# Patient Record
Sex: MALE | Race: WHITE | NOT HISPANIC OR LATINO | Employment: STUDENT | URBAN - METROPOLITAN AREA
[De-identification: names, ages, dates, MRNs, and addresses within clinical notes are randomized per-mention and may not be internally consistent; named-entity substitution may affect disease eponyms.]

---
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
## 2024-01-05 ENCOUNTER — TELEPHONE (OUTPATIENT)
Dept: ADMINISTRATIVE | Facility: HOSPITAL | Age: 19
End: 2024-01-05
Payer: COMMERCIAL

## 2024-01-11 DIAGNOSIS — R05.3 CHRONIC COUGH: ICD-10-CM

## 2024-01-13 DIAGNOSIS — R05.9 COUGH, UNSPECIFIED TYPE: Primary | ICD-10-CM

## 2024-01-19 ENCOUNTER — TELEPHONE (OUTPATIENT)
Dept: INTERNAL MEDICINE | Facility: CLINIC | Age: 19
End: 2024-01-19
Payer: COMMERCIAL

## 2024-01-19 NOTE — TELEPHONE ENCOUNTER
----- Message from Gray Blunt MA sent at 1/19/2024 11:13 AM CST -----  [facility] calling to inform the provider the prescription budesonide (PULMICORT FLEXHALER) 90 mcg/actuation AePB is not covered by the patient's insurance. [facility] at [phone] Ref#: [id number]    Preferred Covered prescriptions are:  Arnuity inhaler 50mcg 100mcg and 200mcg in stock   Qvar inhaler 40mcg and 80mcg

## 2024-03-13 ENCOUNTER — PATIENT MESSAGE (OUTPATIENT)
Dept: INTERNAL MEDICINE | Facility: CLINIC | Age: 19
End: 2024-03-13
Payer: COMMERCIAL

## 2024-03-13 DIAGNOSIS — F41.9 ANXIETY: Primary | ICD-10-CM

## 2024-03-13 DIAGNOSIS — F32.A DEPRESSION, UNSPECIFIED DEPRESSION TYPE: ICD-10-CM

## 2024-03-14 RX ORDER — HYDROXYZINE HYDROCHLORIDE 25 MG/1
25 TABLET, FILM COATED ORAL 3 TIMES DAILY PRN
Qty: 90 TABLET | Refills: 2 | Status: SHIPPED | OUTPATIENT
Start: 2024-03-14 | End: 2024-06-12

## 2024-03-14 RX ORDER — SERTRALINE HYDROCHLORIDE 100 MG/1
100 TABLET, FILM COATED ORAL DAILY
Qty: 90 TABLET | Refills: 3 | Status: SHIPPED | OUTPATIENT
Start: 2024-03-14 | End: 2025-03-14

## 2024-05-21 ENCOUNTER — OFFICE VISIT (OUTPATIENT)
Dept: INTERNAL MEDICINE | Facility: CLINIC | Age: 19
End: 2024-05-21
Payer: COMMERCIAL

## 2024-05-21 VITALS
WEIGHT: 149.5 LBS | HEART RATE: 71 BPM | OXYGEN SATURATION: 99 % | HEIGHT: 69 IN | DIASTOLIC BLOOD PRESSURE: 76 MMHG | BODY MASS INDEX: 22.14 KG/M2 | SYSTOLIC BLOOD PRESSURE: 112 MMHG

## 2024-05-21 DIAGNOSIS — K59.00 CONSTIPATION, UNSPECIFIED CONSTIPATION TYPE: ICD-10-CM

## 2024-05-21 DIAGNOSIS — R63.0 DECREASE IN APPETITE: ICD-10-CM

## 2024-05-21 DIAGNOSIS — F50.82 AVOIDANT-RESTRICTIVE FOOD INTAKE DISORDER (ARFID): Primary | ICD-10-CM

## 2024-05-21 PROCEDURE — 99999 PR PBB SHADOW E&M-EST. PATIENT-LVL III: CPT | Mod: PBBFAC,,,

## 2024-05-21 RX ORDER — OLANZAPINE 5 MG/1
5 TABLET ORAL NIGHTLY
Qty: 30 TABLET | Refills: 11 | Status: CANCELLED | OUTPATIENT
Start: 2024-05-21 | End: 2025-05-21

## 2024-05-21 RX ORDER — MIRTAZAPINE 7.5 MG/1
7.5 TABLET, FILM COATED ORAL NIGHTLY
Qty: 30 TABLET | Refills: 11 | Status: CANCELLED | OUTPATIENT
Start: 2024-05-21 | End: 2025-05-21

## 2024-05-21 RX ORDER — CYPROHEPTADINE HYDROCHLORIDE 4 MG/1
TABLET ORAL
Qty: 98 TABLET | Refills: 0 | Status: SHIPPED | OUTPATIENT
Start: 2024-05-21

## 2024-05-21 NOTE — PATIENT INSTRUCTIONS
Start taking cyproheptadine (first half a pill 4x/day for a week, then a whole pill 4x/day)  Please reach out if anxiety/depression is worse since cyproheptadine can interact with zoloft to make it less effective.

## 2024-05-21 NOTE — ASSESSMENT & PLAN NOTE
Patient has been gaining weight and adding more variety to his diet. 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 His RD recommended an appetite stimulant so that he can keep up with his caloric expenditure. Not many evidence based medications that are studied for ARFID specifically. Off label uses of olanzapine and mirtazapine have case report/case series level evidence. Based on widespread use and meta-analysis level evidence showing that it has shown weight gain across for a variety of underweight populations, we will choose cyproheptadine.     - will trial cyproheptadine w/ the knowledge that it may have some interaction with zoloft and decrease efficacy of zoloft. Patient counseled on this and will let us know if anxiety is worse.

## 2024-05-21 NOTE — ASSESSMENT & PLAN NOTE
Has BM's daily, but can take extra time. Stools not especially hard. Diet is not particularly a high fiber diet given that it includes fast foods and high fat/high calorie items. ARFID impacts how much fruit/vegetables he can incorporate.     - counseled on fiber supplement like benefiber.

## 2024-05-21 NOTE — PROGRESS NOTES
"Clinic Note  5/21/2024      Subjective:       Patient ID:  Daniel is a 18 y.o. male being seen for an established care visit.    Chief Complaint: Anorexia    17 yo M with hx of ARFID, anxiety who presents to inquire about appetite stimulants for ARFID. He has tried  He is currently on zoloft for anxiety and taking hydroxyzine infrequently prn.     ARFID:   - sees licensed clinical psychologist Courtney Benz. Per her note, "He will discuss with his primary care dr or current meds provider possibility of appetite stimulant as rec'd by RD"   - has gained weight, BMI 21 now   - RD recommended 2 potential appetite stimulants: remeron and something else   - diet: chick-raymond-a, pizza, burger at food court. Toast, waffle, pancake, burgers, cane's, perdomo, added chiplote. Supplementing w/ protein powder with milk. Eating 2.5 meals/day (waking up, snack before work, late night meal after work)    - went [employer] last semester  - working out more (lifting weight 5-6 times/weight). Works at [employer] as a . Trying to keep up w/ calories      Wt Readings from Last 9 Encounters:  05/21/24 : 67.8 kg (149 lb 7.6 oz) (42%, Z= -0.21)*  07/24/23 : 60.3 kg (132 lb 15 oz) (19%, Z= -0.88)*  04/25/22 : 57.6 kg (126 lb 15.8 oz) (18%, Z= -0.91)*  10/18/21 : 57.2 kg (126 lb 1.7 oz) (22%, Z= -0.79)*  09/13/21 : 59.1 kg (130 lb 4.7 oz) (30%, Z= -0.54)*  06/15/20 : 56.1 kg (123 lb 9.1 oz) (36%, Z= -0.36)*  03/25/20 : 55.5 kg (122 lb 5.7 oz) (38%, Z= -0.31)*  03/11/20 : 57.2 kg (126 lb 1.7 oz) (45%, Z= -0.12)*  02/19/20 : 56.4 kg (124 lb 5.4 oz) (43%, Z= -0.17)*    * Growth percentiles are based on CDC (Boys, 2-20 Years) data.      Review of Systems   Constitutional:  Negative for weight loss.   Gastrointestinal:  Positive for constipation. Negative for abdominal pain, nausea and vomiting.   Psychiatric/Behavioral:  The patient is not nervous/anxious.        Medication List with Changes/Refills   New Medications    CYPROHEPTADINE (PERIACTIN) 4 MG " "TABLET    Start by taking half a pill four times per day for a week (cut pill in half for 2mg), then take 1 pill (4mg) four times per day   Current Medications    BUDESONIDE (PULMICORT FLEXHALER) 90 MCG/ACTUATION AEPB    Inhale 1 puff (90 mcg total) into the lungs 2 (two) times daily. Controller    FLUTICASONE FUROATE (ARNUITY ELLIPTA) 100 MCG/ACTUATION INHALER    Inhale 1 puff into the lungs daily as needed (cough). Controller    HYDROXYZINE HCL (ATARAX) 25 MG TABLET    Take 1 tablet (25 mg total) by mouth 3 (three) times daily as needed for Anxiety.    LORATADINE 10 MG CAP    Take 10 mg by mouth once daily.    SERTRALINE (ZOLOFT) 100 MG TABLET    Take 1 tablet (100 mg total) by mouth once daily.    UNABLE TO FIND    [employer] [facility] meal replacement shake - vanilla  Take 16oz po daily       Patient Active Problem List   Diagnosis    Cough    Dyspnea    Tickle in throat    Abnormal antibody titer    Avoidant-restrictive food intake disorder (ARFID)    Depression    Adjustment disorder    Constipation           Objective:      /76 (BP Location: Right arm, Patient Position: Sitting, BP Method: Large (Automatic))   Pulse 71   Ht 5' 9" (1.753 m)   Wt 67.8 kg (149 lb 7.6 oz)   SpO2 99%   BMI 22.07 kg/m²   Estimated body mass index is 22.07 kg/m² as calculated from the following:    Height as of this encounter: 5' 9" (1.753 m).    Weight as of this encounter: 67.8 kg (149 lb 7.6 oz).  Physical Exam  Constitutional:       General: He is not in acute distress.     Appearance: Normal appearance. He is underweight. He is not ill-appearing.   HENT:      Head: Normocephalic and atraumatic.   Eyes:      General: No scleral icterus.  Cardiovascular:      Rate and Rhythm: Normal rate and regular rhythm.      Pulses: Normal pulses.      Heart sounds: Normal heart sounds. No murmur heard.  Pulmonary:      Effort: Pulmonary effort is normal. No respiratory distress.      Breath sounds: Normal breath sounds. No wheezing or " rales.   Musculoskeletal:         General: Normal range of motion.      Right lower leg: No edema.      Left lower leg: No edema.   Skin:     General: Skin is warm and dry.   Neurological:      General: No focal deficit present.      Mental Status: He is alert and oriented to person, place, and time.   Psychiatric:         Mood and Affect: Mood normal.         Behavior: Behavior normal.         Assessment and Plan:         Problem List Items Addressed This Visit          Psychiatric    Avoidant-restrictive food intake disorder (ARFID) - Primary    Current Assessment & Plan     Patient has been gaining weight and adding more variety to his diet. 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 His RD recommended an appetite stimulant so that he can keep up with his caloric expenditure. Not many evidence based medications that are studied for ARFID specifically. Off label uses of olanzapine and mirtazapine have case report/case series level evidence. Based on widespread use and meta-analysis level evidence showing that it has shown weight gain across for a variety of underweight populations, we will choose cyproheptadine.     - will trial cyproheptadine w/ the knowledge that it may have some interaction with zoloft and decrease efficacy of zoloft. Patient counseled on this and will let us know if anxiety is worse.          Relevant Medications    cyproheptadine (PERIACTIN) 4 mg tablet       GI    Constipation    Current Assessment & Plan     Has BM's daily, but can take extra time. Stools not especially hard. Diet is not particularly a high fiber diet given that it includes fast foods and high fat/high calorie items. ARFID impacts how much fruit/vegetables he can incorporate.     - counseled on fiber supplement like benefiber.           Other Visit Diagnoses       Decrease in appetite        Relevant Medications    cyproheptadine (PERIACTIN) 4 mg tablet            Follow Up:   Follow  up in about 4 weeks (around 6/18/2024) for follow up on anxiety/appetite stimulant working .        Chase Leblanc      Signed:   Chase Leblanc MD - PGY3  Internal Medicine  [facility]

## 2024-08-08 DIAGNOSIS — F41.9 ANXIETY: ICD-10-CM

## 2024-08-08 RX ORDER — SERTRALINE HYDROCHLORIDE 100 MG/1
100 TABLET, FILM COATED ORAL DAILY
Qty: 90 TABLET | Refills: 0 | Status: SHIPPED | OUTPATIENT
Start: 2024-08-08 | End: 2025-08-08

## 2025-02-18 ENCOUNTER — OFFICE VISIT (OUTPATIENT)
Dept: INTERNAL MEDICINE | Facility: CLINIC | Age: 20
End: 2025-02-18
Payer: COMMERCIAL

## 2025-02-18 DIAGNOSIS — F32.A DEPRESSION, UNSPECIFIED DEPRESSION TYPE: Primary | ICD-10-CM

## 2025-02-18 DIAGNOSIS — F41.9 ANXIETY: ICD-10-CM

## 2025-02-18 RX ORDER — SERTRALINE HYDROCHLORIDE 100 MG/1
100 TABLET, FILM COATED ORAL DAILY
Qty: 90 TABLET | Refills: 1 | Status: SHIPPED | OUTPATIENT
Start: 2025-02-18 | End: 2026-02-18

## 2025-02-18 NOTE — PROGRESS NOTES
Subjective:       Patient ID: Daniel Hirsch is a 19 y.o. male.    Chief Complaint: No chief complaint on file.  The patient location is: [place]  The chief complaint leading to consultation is: Anxiety/depression    Visit type: audiovisual    Face to Face time with patient: 5 minutes  10 minutes of total time spent on the encounter, which includes face to face time and non-face to face time preparing to see the patient (eg, review of tests), Obtaining and/or reviewing separately obtained history, Documenting clinical information in the electronic or other health record, Independently interpreting results (not separately reported) and communicating results to the patient/family/caregiver, or Care coordination (not separately reported).       Each patient to whom he or she provides medical services by telemedicine is:  (1) informed of the relationship between the physician and patient and the respective role of any other health care provider with respect to management of the patient; and (2) notified that he or she may decline to receive medical services by telemedicine and may withdraw from such care at any time.    Notes:     History of Present Illness    CHIEF COMPLAINT:  - Mr. Hirsch presents for a refill of his Zoloft prescription.    HPI:  Mr. Hirsch reports a history of depression, adjustment disorder, and an eating disorder. He has been stable on his current Zoloft regimen of 100 mg daily. He also uses a Pulmicort inhaler for a seasonal cough that occurs during the colder months. This cough is persistent, lasting for months if untreated. Using the inhaler when the cough starts is effective in controlling the symptoms.    He denies any negative side effects from the Zoloft medication.         Problem List[1]      Past Surgical History:   Procedure Laterality Date    ADENOIDECTOMY      TONSILLECTOMY      TYMPANOSTOMY TUBE PLACEMENT         Current Medications[2]    Review of Systems   Constitutional:  Negative for  activity change, fatigue, fever and unexpected weight change.   HENT:  Negative for hearing loss, rhinorrhea and trouble swallowing.    Eyes:  Negative for discharge and visual disturbance.   Respiratory:  Negative for chest tightness and wheezing.    Cardiovascular:  Negative for chest pain and palpitations.   Gastrointestinal:  Negative for blood in stool, constipation, diarrhea and vomiting.   Endocrine: Negative for polydipsia and polyuria.   Genitourinary:  Negative for difficulty urinating, hematuria and urgency.   Musculoskeletal:  Negative for arthralgias, joint swelling and neck pain.   Neurological:  Negative for weakness and headaches.   Psychiatric/Behavioral:  Negative for confusion and dysphoric mood.        Objective:   There were no vitals taken for this visit.     Physical Exam  Constitutional:       General: He is not in acute distress.     Appearance: Normal appearance. He is not ill-appearing.   HENT:      Head: Normocephalic.   Pulmonary:      Effort: Pulmonary effort is normal. No respiratory distress.   Neurological:      Mental Status: He is alert and oriented to person, place, and time.   Psychiatric:         Mood and Affect: Mood normal.         Assessment & Plan     Problem List Items Addressed This Visit          Psychiatric    Depression - Primary    Current Assessment & Plan   Zoloft refilled.           Other Visit Diagnoses         Anxiety        Relevant Medications    sertraline (ZOLOFT) 100 MG tablet            Assessment & Plan    MEDICAL DECISION MAKING:  - Reviewed patient's history of depression, adjustment disorder, and eating disorder  - Confirmed patient's stability on current Zoloft regimen  - Evaluated for any negative side effects from current medication    MEDICATIONS:  - Continued Zoloft 100 mg daily    FOLLOW UP:  - Contact the office if needed          Follow up if symptoms worsen or fail to improve.          Portions of this note may have been created with voice  "recognition software. Occasional "wrong-word" or "sound-a-like" substitutions may have occurred due to the inherent limitations of voice recognition software. Please, read the note carefully and recognize, using context, where substitutions have occurred.       This note was generated with the assistance of ambient listening technology. Verbal consent was obtained by the patient and accompanying visitor(s) for the recording of patient appointment to facilitate this note. I attest to having reviewed and edited the generated note for accuracy, though some syntax or spelling errors may persist. Please contact the author of this note for any clarification.            [1]   Patient Active Problem List  Diagnosis    Cough    Dyspnea    Tickle in throat    Abnormal antibody titer    Avoidant-restrictive food intake disorder (ARFID)    Depression    Adjustment disorder    Constipation   [2]   Current Outpatient Medications:     budesonide (PULMICORT FLEXHALER) 90 mcg/actuation AePB, Inhale 1 puff (90 mcg total) into the lungs 2 (two) times daily. Controller, Disp: 1 each, Rfl: 1    cyproheptadine (PERIACTIN) 4 mg tablet, Start by taking half a pill four times per day for a week (cut pill in half for 2mg), then take 1 pill (4mg) four times per day, Disp: 98 tablet, Rfl: 0    fluticasone furoate (ARNUITY ELLIPTA) 100 mcg/actuation inhaler, Inhale 1 puff into the lungs daily as needed (cough). Controller, Disp: 30 each, Rfl: 3    loratadine 10 mg Cap, Take 10 mg by mouth once daily., Disp: , Rfl:     sertraline (ZOLOFT) 100 MG tablet, Take 1 tablet (100 mg total) by mouth once daily., Disp: 90 tablet, Rfl: 1    UNABLE TO FIND, Natalee [email or web address] meal replacement shake - vanilla Take 16oz po daily, Disp: 30 Container, Rfl: 11    "

## 2025-03-17 DIAGNOSIS — F41.9 ANXIETY: ICD-10-CM

## 2025-03-17 RX ORDER — SERTRALINE HYDROCHLORIDE 100 MG/1
100 TABLET, FILM COATED ORAL DAILY
Qty: 90 TABLET | Refills: 1 | Status: SHIPPED | OUTPATIENT
Start: 2025-03-17 | End: 2026-03-17

## 2025-06-13 ENCOUNTER — RESULTS FOLLOW-UP (OUTPATIENT)
Dept: INTERNAL MEDICINE | Facility: CLINIC | Age: 20
End: 2025-06-13

## 2025-06-13 ENCOUNTER — LAB VISIT (OUTPATIENT)
Dept: LAB | Facility: OTHER | Age: 20
End: 2025-06-13
Attending: INTERNAL MEDICINE
Payer: COMMERCIAL

## 2025-06-13 ENCOUNTER — OFFICE VISIT (OUTPATIENT)
Dept: INTERNAL MEDICINE | Facility: CLINIC | Age: 20
End: 2025-06-13
Payer: COMMERCIAL

## 2025-06-13 VITALS
SYSTOLIC BLOOD PRESSURE: 118 MMHG | HEART RATE: 84 BPM | DIASTOLIC BLOOD PRESSURE: 80 MMHG | WEIGHT: 173.31 LBS | OXYGEN SATURATION: 97 % | HEIGHT: 69 IN | BODY MASS INDEX: 25.67 KG/M2

## 2025-06-13 DIAGNOSIS — Z13.1 SCREENING FOR DIABETES MELLITUS (DM): ICD-10-CM

## 2025-06-13 DIAGNOSIS — Z13.6 SCREENING FOR CARDIOVASCULAR CONDITION: ICD-10-CM

## 2025-06-13 DIAGNOSIS — F43.20 ADJUSTMENT DISORDER, UNSPECIFIED TYPE: ICD-10-CM

## 2025-06-13 DIAGNOSIS — F32.A DEPRESSION, UNSPECIFIED DEPRESSION TYPE: ICD-10-CM

## 2025-06-13 DIAGNOSIS — Z00.01 ENCOUNTER FOR ROUTINE ADULT HEALTH EXAMINATION WITH ABNORMAL FINDINGS: ICD-10-CM

## 2025-06-13 DIAGNOSIS — J30.1 SEASONAL ALLERGIC RHINITIS DUE TO POLLEN: Primary | ICD-10-CM

## 2025-06-13 LAB
ABSOLUTE EOSINOPHIL (OHS): 0.14 K/UL
ABSOLUTE MONOCYTE (OHS): 1.06 K/UL (ref 0.3–1)
ABSOLUTE NEUTROPHIL COUNT (OHS): 7.97 K/UL (ref 1.8–7.7)
ALBUMIN SERPL BCP-MCNC: 4.2 G/DL (ref 3.5–5.2)
ALP SERPL-CCNC: 80 UNIT/L (ref 40–150)
ALT SERPL W/O P-5'-P-CCNC: 20 UNIT/L (ref 10–44)
ANION GAP (OHS): 13 MMOL/L (ref 8–16)
AST SERPL-CCNC: 22 UNIT/L (ref 11–45)
BASOPHILS # BLD AUTO: 0.02 K/UL
BASOPHILS NFR BLD AUTO: 0.2 %
BILIRUB SERPL-MCNC: 0.8 MG/DL (ref 0.1–1)
BUN SERPL-MCNC: 19 MG/DL (ref 6–20)
CALCIUM SERPL-MCNC: 10 MG/DL (ref 8.7–10.5)
CHLORIDE SERPL-SCNC: 104 MMOL/L (ref 95–110)
CHOLEST SERPL-MCNC: 159 MG/DL (ref 120–199)
CHOLEST/HDLC SERPL: 3.2 {RATIO} (ref 2–5)
CO2 SERPL-SCNC: 25 MMOL/L (ref 23–29)
CREAT SERPL-MCNC: 0.8 MG/DL (ref 0.5–1.4)
EAG (OHS): 94 MG/DL (ref 68–131)
ERYTHROCYTE [DISTWIDTH] IN BLOOD BY AUTOMATED COUNT: 12.1 % (ref 11.5–14.5)
GFR SERPLBLD CREATININE-BSD FMLA CKD-EPI: >60 ML/MIN/1.73/M2
GLUCOSE SERPL-MCNC: 99 MG/DL (ref 70–110)
HBA1C MFR BLD: 4.9 % (ref 4–5.6)
HCT VFR BLD AUTO: 43.7 % (ref 40–54)
HDLC SERPL-MCNC: 49 MG/DL (ref 40–75)
HDLC SERPL: 30.8 % (ref 20–50)
HGB BLD-MCNC: 15.2 GM/DL (ref 14–18)
IMM GRANULOCYTES # BLD AUTO: 0.02 K/UL (ref 0–0.04)
IMM GRANULOCYTES NFR BLD AUTO: 0.2 % (ref 0–0.5)
LDLC SERPL CALC-MCNC: 97.2 MG/DL (ref 63–159)
LYMPHOCYTES # BLD AUTO: 1.33 K/UL (ref 1–4.8)
MCH RBC QN AUTO: 30.8 PG (ref 27–31)
MCHC RBC AUTO-ENTMCNC: 34.8 G/DL (ref 32–36)
MCV RBC AUTO: 89 FL (ref 82–98)
NONHDLC SERPL-MCNC: 110 MG/DL
NUCLEATED RBC (/100WBC) (OHS): 0 /100 WBC
PLATELET # BLD AUTO: 253 K/UL (ref 150–450)
PMV BLD AUTO: 9.2 FL (ref 9.2–12.9)
POTASSIUM SERPL-SCNC: 4.6 MMOL/L (ref 3.5–5.1)
PROT SERPL-MCNC: 8.1 GM/DL (ref 6–8.4)
RBC # BLD AUTO: 4.94 M/UL (ref 4.6–6.2)
RELATIVE EOSINOPHIL (OHS): 1.3 %
RELATIVE LYMPHOCYTE (OHS): 12.6 % (ref 18–48)
RELATIVE MONOCYTE (OHS): 10.1 % (ref 4–15)
RELATIVE NEUTROPHIL (OHS): 75.6 % (ref 38–73)
SODIUM SERPL-SCNC: 142 MMOL/L (ref 136–145)
T4 SERPL-MCNC: 6.5 UG/DL (ref 4.5–11.5)
TRIGL SERPL-MCNC: 64 MG/DL (ref 30–150)
TSH SERPL-ACNC: 1.9 UIU/ML (ref 0.4–4)
WBC # BLD AUTO: 10.54 K/UL (ref 3.9–12.7)

## 2025-06-13 PROCEDURE — 84436 ASSAY OF TOTAL THYROXINE: CPT

## 2025-06-13 PROCEDURE — 80053 COMPREHEN METABOLIC PANEL: CPT

## 2025-06-13 PROCEDURE — 83036 HEMOGLOBIN GLYCOSYLATED A1C: CPT

## 2025-06-13 PROCEDURE — 99999 PR PBB SHADOW E&M-EST. PATIENT-LVL III: CPT | Mod: PBBFAC,,, | Performed by: INTERNAL MEDICINE

## 2025-06-13 PROCEDURE — 85025 COMPLETE CBC W/AUTO DIFF WBC: CPT

## 2025-06-13 PROCEDURE — 80061 LIPID PANEL: CPT

## 2025-06-13 PROCEDURE — 84443 ASSAY THYROID STIM HORMONE: CPT

## 2025-06-13 PROCEDURE — 36415 COLL VENOUS BLD VENIPUNCTURE: CPT

## 2025-06-13 NOTE — PROGRESS NOTES
Subjective:      Patient ID: Daniel Hirsch is a 19 y.o. male.    Chief Complaint: Establish Care      History of Present Illness    CHIEF COMPLAINT:  Patient presents today for an annual check-up.  HISTORY OF PRESENT ILLNESS:  The patient is a 19-year-old male presenting for establishment of care with a new primary care provider, an annual wellness examination, and ongoing management of his chronic medical conditions, including depression, adjustment disorder, and allergic rhinitis.  Today, he reports throat pain when swallowing, which he attributes to a current cough. He denies postnasal drip, fever, headache, sore throat, or wheezing. He has a history of seasonal allergies dating back to 8th or 9th grade, with symptoms most pronounced during the winter months. He is currently taking Zyrtec and using a Flonase inhaler, both of which he reports are effective, typically resolving symptoms within a few days of use.  Psychiatric history is significant for depression and adjustment disorder, for which he was evaluated by psychiatry approximately one year ago. He is currently taking sertraline (Zoloft) 150 mg daily. He reports no recurrence of depressive symptoms, anxiety, or insomnia. He denies suicidal ideation, past attempts, or any plans or intent.  He is currently a college student studying mathematics. He denies cigarette smoking, alcohol consumption, marijuana use, or other illicit drug use. He is single.  This is his first visit with me. I have reviewed his prior medical records, laboratory results, and current medications. The visit lasted approximately 10 minutes.    FAMILY HISTORY:  Grandfather and aunt passed away from lung cancer, both with histories of chronic smoking  Strong family history of depression, including both parents and his sister, all of whom are on antidepressant therapy  No known family history of diabetes, hypertension, coronary artery disease, stroke, colon cancer, or prostate  cancer    ROS:  General: -fever, -chills, -fatigue, -weight gain, -weight loss  Eyes: -vision changes, -redness, -discharge  ENT: -ear pain, -nasal congestion, +sore throat  Cardiovascular: -chest pain, -palpitations, -lower extremity edema  Respiratory: +cough, -shortness of breath  Gastrointestinal: -abdominal pain, -nausea, -vomiting, -diarrhea, -constipation, -blood in stool  Genitourinary: -dysuria, -hematuria, -frequency  Musculoskeletal: -joint pain, -muscle pain  Skin: -rash, -lesion  Neurological: -headache, -dizziness, -numbness, -tingling  Psychiatric: -anxiety, -depression, -sleep difficulty  Allergic: +seasonal allergies         Active Problem List with Overview Notes    Diagnosis Date Noted    Constipation 05/21/2024    Depression 07/24/2023    Adjustment disorder 07/24/2023    Avoidant-restrictive food intake disorder (ARFID) 04/25/2022    Cough     Dyspnea     Tickle in throat     Abnormal antibody titer         MEDICATIONS:  Current Medications[1]     Current Outpatient Medications:     cyproheptadine (PERIACTIN) 4 mg tablet, Start by taking half a pill four times per day for a week (cut pill in half for 2mg), then take 1 pill (4mg) four times per day, Disp: 98 tablet, Rfl: 0    fluticasone furoate (ARNUITY ELLIPTA) 100 mcg/actuation inhaler, Inhale 1 puff into the lungs daily as needed (cough). Controller, Disp: 30 each, Rfl: 3    loratadine 10 mg Cap, Take 10 mg by mouth once daily., Disp: , Rfl:     sertraline (ZOLOFT) 100 MG tablet, Take 1 tablet (100 mg total) by mouth once daily., Disp: 90 tablet, Rfl: 1    UNABLE TO FIND, [employer] meal replacement shake - vanilla Take 16oz po daily, Disp: 30 Container, Rfl: 11    budesonide (PULMICORT FLEXHALER) 90 mcg/actuation AePB, Inhale 1 puff (90 mcg total) into the lungs 2 (two) times daily. Controller, Disp: 1 each, Rfl: 1  ALLERGIES:  Review of patient's allergies indicates:  No Known Allergies     Past Medical History:   Diagnosis Date    Abnormal  "antibody titer     Cough     Dyspnea     Otitis media     Pharyngitis     Tickle in throat      Past Surgical History:   Procedure Laterality Date    ADENOIDECTOMY      TONSILLECTOMY      TYMPANOSTOMY TUBE PLACEMENT       Social History     Social History Narrative    Daniel lives with mom primarily, visits dad every other weekend. Mom has 4 dogs. 1 older step sister, 22 years old does not live at home.    Daniel love to play video games , lego, soccer and loves to read. Attends [employer].      Family History   Problem Relation Name Age of Onset    Thyroid disease Mother      Allergies Mother          seasonal    Diabetes Neg Hx      Blindness Neg Hx         Vitals:    06/13/25 1000   BP: 118/80   Pulse: 84   SpO2: 97%   Weight: 78.6 kg (173 lb 4.5 oz)   Height: 5' 9" (1.753 m)   PainSc: 0-No pain       Review of Systems     Lab Results   Component Value Date    HGBA1C 4.6 07/24/2023     No results found for: "MICALBCREAT"  No results found for: "LDLCALC", "CHOL", "HDL", "TRIG"    Lab Results   Component Value Date     07/24/2023    K 4.0 07/24/2023     07/24/2023    CO2 27 07/24/2023    GLU 74 07/24/2023    BUN 8 07/24/2023    CREATININE 0.8 07/24/2023    CALCIUM 9.9 07/24/2023    PROT 7.3 07/24/2023    ALBUMIN 4.4 07/24/2023    BILITOT 1.0 07/24/2023    ALKPHOS 53 (L) 07/24/2023    AST 23 07/24/2023    ALT 26 07/24/2023    ANIONGAP 9 07/24/2023    ESTGFRAFRICA SEE COMMENT 04/25/2022    EGFRNONAA SEE COMMENT 04/25/2022    WBC 5.84 07/24/2023    HGB 15.1 07/24/2023    HGB 15.2 04/25/2022    HCT 42.6 07/24/2023    MCV 90 07/24/2023     07/24/2023    TSH 1.571 04/25/2022       Lab Results   Component Value Date    TOTALTESTOST 448 04/25/2022       Objective:   Physical Exam   Physical Exam    General: No acute distress. Well-developed. Well-nourished.  Eyes: EOMI. Sclerae anicteric.  HENT: Normocephalic. Atraumatic. Nares patent. Moist oral mucosa.  Ears: Bilateral TMs clear. Bilateral EACs " clear.  Cardiovascular: Regular rate. Regular rhythm. No murmurs. No rubs. No gallops. Normal S1, S2.  Respiratory: Normal respiratory effort. Clear to auscultation bilaterally. No rales. No rhonchi. No wheezing.  Abdomen: Soft. Non-tender. Non-distended. Normoactive bowel sounds.  Musculoskeletal: No  obvious deformity.  Extremities: No lower extremity edema.  Neurological: Alert & oriented x3. No slurred speech. Normal gait.  Psychiatric: Normal mood. Normal affect. Good insight. Good judgment.  Skin: Warm. Dry. No rash.         Assessment:     1. Seasonal allergic rhinitis due to pollen    2. Encounter for routine adult health examination with abnormal findings    3. Screening for diabetes mellitus (DM)    4. Screening for cardiovascular condition    5. Depression, unspecified depression type    6. Adjustment disorder, unspecified type      Plan:   Assessment & Plan   Seasonal allergic rhinitis due to pollen  - continue taking Zyrtec 10 mg daily as needed  - continue using Flonase nasal as needed  Encounter for routine adult health examination with abnormal findings  -     CBC Auto Differential; Future; Expected date: 06/13/2025  -     Comprehensive Metabolic Panel; Future; Expected date: 06/13/2025  -     Hemoglobin A1C; Future; Expected date: 06/13/2025  -     Lipid Panel; Future; Expected date: 06/13/2025    Screening for diabetes mellitus (DM)  -     Hemoglobin A1C; Future; Expected date: 06/13/2025    Screening for cardiovascular condition  -     Lipid Panel; Future; Expected date: 06/13/2025    Depression, unspecified depression type  -     TSH; Future; Expected date: 06/13/2025  -     T4; Future; Expected date: 06/13/2025  - continue taking Zoloft 100 mg daily  - follow-up psychiatry clinic as scheduled  Adjustment disorder, unspecified type  -     TSH; Future; Expected date: 06/13/2025  -     T4; Future; Expected date: 06/13/2025  - follow-up in psychiatry clinic as scheduled     Assessment & Plan     F32.9 Major depressive disorder, single episode, unspecified  J30.2 Other seasonal allergic rhinitis  J02.9 Acute pharyngitis, unspecified  T43.226D Underdosing of selective serotonin reuptake inhibitors, subsequent encounter  Z80.1 Family history of malignant neoplasm of trachea, bronchus and lung  Z81.8 Family history of other mental and behavioral disorders    IMPRESSION:  Conducted annual check-up, including review of psychiatric history and current medication regimen.  Evaluated family history for chronic conditions and cancers.    MAJOR DEPRESSIVE DISORDER:  - Continue Zoloft 150 mg daily.  - Patient is not currently taking bupropion.  - Assessed history of psychiatric care for depression and adjustment problems.  - Family history of mental health disorders requiring similar medication in father, younger sister, and possibly mother.    SEASONAL ALLERGIC RHINITIS:  - Patient reports seasonal allergies, especially in winter, beginning around 8th or 9th grade.  - Uses an inhaler which alleviates symptoms after approximately 2 days.    ACUTE PHARYNGITIS:  - Monitored reported cough and odynophagia.  - Throat exam revealed clear sounds.    FAMILY HISTORY OF LUNG CANCER:  - Family history of lung cancer in grandfather and aunt, attributed to smoking.    ANNUAL CHECK-UP AND FOLLOW-UP:  - Ordered CBC, CMP, lipid panel, renal function tests, liver function tests, thyroid function tests, and glucose test for annual follow-up and diabetes screening.  - Patient to contact office in approximately 1 week for blood test results.  - Will review results and contact patient via [facility] or phone call if any concerns arise.         Orders Placed This Encounter    CBC Auto Differential    Comprehensive Metabolic Panel    Hemoglobin A1C    Lipid Panel    TSH    T4       Follow up in about 2 months (around 8/13/2025).     There are  Patient Instructions on file for this visit.  [unfilled]   All of your core healthy metrics  are met.       Seasonal Allergies   General Information   You came to the Emergency Department (ED) for seasonal allergies. This is sometimes known as hay fever. You may have sneezing; a stuffy or runny nose; or itchy throat, ears, or eyes. You may feel very tired. Most often, this problem is caused by:  Pollens from trees, grasses, or weeds.  Mold spores that grow when the air is humid, wet, or damp.  Some people can breathe in these things and have no problems. But when you have a seasonal allergy, your body acts as if the substance is harming you. Then you have symptoms. You may have symptoms only at some times of the year. If your symptoms last all year, they may be caused by:  Insects, such as dust mites or cock roaches.  Animals, such as cats or dogs.  Mold spores.  What care is needed at home?   Call your regular doctor to let them know you were in the ED. Make a follow-up appointment if you were told to.  Rinse your nose with salt water to get rid of pollen inside of your nose.  Keep the windows closed and use air conditioning.  Shower before bed to rinse pollen from your hair and skin.  Wear a dust mask if you need to be outside.  Use over-the-counter medicines to help with your symptoms:  A steroid nose spray can help with a stuffy nose. It can also help with drainage down the back of your throat.  An antihistamine can help with itching, sneezing, or runny nose.  An antihistamine eye drop can help with itchy eyes.  A decongestant can help with a stuffy nose. Talk with your doctor or pharmacist about your other health problems before you use this kind of medicine. It may not be safe for people with high blood pressure.  When do I need to get emergency help?   Call for an ambulance right away if:   You are having so much trouble breathing that you can only say one or two words at a time.  You need to sit upright at all times to be able to breathe and or cannot lie down.  You have severe swelling of your  tongue, lips, or mouth.  Return to the ED if:   You have trouble breathing when talking or sitting still.  When do I need to call the doctor?   You have a fever of 100.4°F (38°C) or higher.  You have green or yellow mucus.  You have new or worsening symptoms.  Last Reviewed Date   2021-03-15  Consumer Information Use and Disclaimer   This generalized information is a limited summary of diagnosis, treatment, and/or medication information. It is not meant to be comprehensive and should be used as a tool to help the user understand and/or assess potential diagnostic and treatment options. It does NOT include all information about conditions, treatments, medications, side effects, or risks that may apply to a specific patient. It is not intended to be medical advice or a substitute for the medical advice, diagnosis, or treatment of a health care provider based on the health care provider's examination and assessment of a patients specific and unique circumstances. Patients must speak with a health care provider for complete information about their health, medical questions, and treatment options, including any risks or benefits regarding use of medications. This information does not endorse any treatments or medications as safe, effective, or approved for treating a specific patient. UpToDate, Inc[employer] and its affiliates disclaim any warranty or liability relating to this information or the use thereof. The use of this information is governed by the Terms of Use, available at https://www.[employer].com/en/know/clinical-effectiveness-terms   Copyright   Copyright © 2024 UpToDate, Inc[employer] and its affiliates and/or licensors. All rights reserved.  Visit Checklist (as applicable):  1. Status of new and prior symptoms discussed? yes  2. Imaging reviewed/ ordered as appropriate? yes  3. Lab study reviewed/ ordered as appropriate? yes  4. Plan for work-up and treatment discussed with patient? yes  5. Potential medication  side-effects and monitoring plan discussed? yes  6. Review of outside medical records was performed and pertinent details are summarized in the HPI above? yes     Time spent on this encounter: 35 minutes. This includes face to face time and non-face to face time preparing to see the patient (eg, review of tests), obtaining and/or reviewing separately obtained history, documenting clinical information in the electronic or other health record, independently interpreting results (not separately reported) and communicating results to the patient/family/caregiver, or care coordination (not separately reported). Also patient education regarding chronic and acute medical conditions reviewed. Patient handouts given if appropriate.     Each patient to whom he or she provides medical services by telemedicine is:  (1) informed of the relationship between the physician and patient and the respective role of any other health care provider with respect to management of the patient; and (2) notified that he or she may decline to receive medical services by telemedicine and may withdraw from such care at any time.     This note was generated with the assistance of ambient listening technology. Verbal consent was obtained by the patient and accompanying visitor(s) for the recording of patient appointment to facilitate this note. I attest to having reviewed and edited the generated note for accuracy, though some syntax or spelling errors may persist. Please contact the author of this note for any clarification.       Cortez Pantoja MD  [facility]                               [1]   Current Outpatient Medications:     fluticasone furoate (ARNUITY ELLIPTA) 100 mcg/actuation inhaler, Inhale 1 puff into the lungs daily as needed (cough). Controller, Disp: 30 each, Rfl: 3    loratadine 10 mg Cap, Take 10 mg by mouth once daily., Disp: , Rfl:     sertraline (ZOLOFT) 100 MG tablet, Take 1 tablet (100 mg total) by mouth once  daily., Disp: 90 tablet, Rfl: 1    UNABLE TO FIND, Natalee [facility] meal replacement shake - vanilla Take 16oz po daily, Disp: 30 Container, Rfl: 11

## 2025-06-16 ENCOUNTER — OFFICE VISIT (OUTPATIENT)
Dept: URGENT CARE | Facility: CLINIC | Age: 20
End: 2025-06-16
Payer: COMMERCIAL

## 2025-06-16 VITALS
BODY MASS INDEX: 25.62 KG/M2 | TEMPERATURE: 98 F | SYSTOLIC BLOOD PRESSURE: 123 MMHG | DIASTOLIC BLOOD PRESSURE: 74 MMHG | HEIGHT: 69 IN | OXYGEN SATURATION: 97 % | RESPIRATION RATE: 20 BRPM | HEART RATE: 91 BPM | WEIGHT: 173 LBS

## 2025-06-16 DIAGNOSIS — B96.89 BACTERIAL SINUSITIS: Primary | ICD-10-CM

## 2025-06-16 DIAGNOSIS — R05.3 CHRONIC COUGH: ICD-10-CM

## 2025-06-16 DIAGNOSIS — J32.9 BACTERIAL SINUSITIS: Primary | ICD-10-CM

## 2025-06-16 PROCEDURE — 99204 OFFICE O/P NEW MOD 45 MIN: CPT | Mod: S$GLB,,, | Performed by: NURSE PRACTITIONER

## 2025-06-16 RX ORDER — FLUTICASONE FUROATE 100 UG/1
1 POWDER RESPIRATORY (INHALATION) DAILY PRN
Qty: 30 EACH | Refills: 0 | Status: SHIPPED | OUTPATIENT
Start: 2025-06-16

## 2025-06-16 RX ORDER — FLUTICASONE PROPIONATE 50 MCG
1 SPRAY, SUSPENSION (ML) NASAL DAILY
Qty: 16 G | Refills: 0 | Status: SHIPPED | OUTPATIENT
Start: 2025-06-16 | End: 2025-06-23

## 2025-06-16 RX ORDER — AMOXICILLIN AND CLAVULANATE POTASSIUM 875; 125 MG/1; MG/1
1 TABLET, FILM COATED ORAL EVERY 12 HOURS
Qty: 14 TABLET | Refills: 0 | Status: SHIPPED | OUTPATIENT
Start: 2025-06-16 | End: 2025-06-23

## 2025-06-16 NOTE — LETTER
June 16, 2025      [facility] [place]  [address] [address]  Phone: [phone]  Fax: [phone]       Patient: Daniel Hirsch   YOB: 2005  Date of Visit: 06/16/2025    To Whom It May Concern:    Angie Hirsch  was at [facility] on 06/16/2025. The patient may return to work/school on 6/18/2025 with no restrictions. If you have any questions or concerns, or if I can be of further assistance, please do not hesitate to contact me.    Sincerely,      SILVINA FineC      LM for patient to call office to schedule a check up

## 2025-06-16 NOTE — PATIENT INSTRUCTIONS
Prescriptions sent to pharmacy:  Augmentin- antibiotic- take twice a day for 7 days. Take with food. May take a probiotic to help with GI side effects.    Flonase- steroid nasal spray- spray in nostrils 1-2 times a day to help with runny nose and sinus congestion    Refilled Flovent inhaler      Other recommendations:  Oral antihistamine (Claritin, Zyrtec, Allegra,or Xyzal) for post nasal drip and runny nose  Cough expectorant (Mucinex DM) to break up mucus in your chest. Take with a full glass of water    Please drink plenty of fluids.  Please get plenty of rest.  Nasal irrigation with a saline spray or Netti Pot like device per their directions is also recommended.  If you  smoke, please stop smoking.    If not allergic, take Tylenol (Acetaminophen) 650 mg to  1 g every 6 hours as needed  and/or Motrin (Ibuprofen) 600 to 800 mg every 6 hours as needed for fever or pain.    Please arrange follow up with your primary care physician or speciality clinic within 2-5 days if your signs and symptoms have not resolved or worsen.     Please return here or go to the Emergency Department for any concerns or worsening of condition.

## 2025-06-16 NOTE — PROGRESS NOTES
"Subjective:      Patient ID: Daniel Hirsch is a 19 y.o. male.    Vitals:  height is 5' 9" (1.753 m) and weight is 78.5 kg (173 lb). His oral temperature is 98 °F (36.7 °C). His blood pressure is 123/74 and his pulse is 91. His respiration is 20 and oxygen saturation is 97%.     Chief Complaint: Cough    This is a 19 y.o. male who presents today with a chief complaint of coughing with yellow/green sputum, runny nose, headaches, sneezing, sinus pressure that began 6 days ago.   Pt has taken OTC DayQuil, sinus severe cold packet with a script inhaler to help with symptoms.     PT requesting a note for the visit.     Pt has questions about current inhaler script.     Provider note begins:  Patient is a 19 year old male here with complaints of sinus congestion, cough, runny nose, headache, x6 days. He has been taking Dayquil with mild relief. He uses Flovent inhaler for chronic cough so he has been using that as well. He is needing a new prescription as the one he is currently using  last year.     Cough  This is a new problem. The current episode started in the past 7 days. The problem has been gradually worsening. The problem occurs constantly. The cough is Productive of sputum. Associated symptoms include nasal congestion and postnasal drip. Pertinent negatives include no chest pain, chills, ear congestion, ear pain, fever, headaches, heartburn, hemoptysis, myalgias, rash, rhinorrhea, sore throat, shortness of breath, sweats, weight loss or wheezing. The symptoms are aggravated by lying down. Treatments tried: OTC DayQuil, sinus severe cold packet. The treatment provided mild relief. There is no history of asthma, bronchiectasis, bronchitis, COPD, emphysema, environmental allergies or pneumonia.       Constitution: Negative for chills, fatigue and fever.   HENT:  Positive for congestion, postnasal drip and sinus pressure. Negative for ear pain and sore throat.    Cardiovascular:  Negative for chest pain. "   Respiratory:  Positive for cough. Negative for bloody sputum, shortness of breath and wheezing.    Gastrointestinal:  Negative for heartburn.   Musculoskeletal:  Negative for muscle ache.   Skin:  Negative for rash.   Allergic/Immunologic: Negative for environmental allergies.   Neurological:  Negative for headaches.      Objective:     Physical Exam   Constitutional: He is oriented to person, place, and time. He appears well-developed. He is cooperative.  Non-toxic appearance. He does not appear ill. No distress.   HENT:   Head: Normocephalic and atraumatic.   Ears:   Right Ear: Hearing, external ear and ear canal normal. A middle ear effusion is present.   Left Ear: Hearing, external ear and ear canal normal. A middle ear effusion is present.   Nose: Rhinorrhea and congestion present. No mucosal edema or nasal deformity. No epistaxis. Right sinus exhibits maxillary sinus tenderness and frontal sinus tenderness. Left sinus exhibits maxillary sinus tenderness and frontal sinus tenderness.   Mouth/Throat: Uvula is midline, oropharynx is clear and moist and mucous membranes are normal. No trismus in the jaw. Normal dentition. No uvula swelling. No oropharyngeal exudate, posterior oropharyngeal edema or posterior oropharyngeal erythema.   Eyes: Conjunctivae and lids are normal. No scleral icterus.   Neck: Trachea normal and phonation normal. Neck supple. No edema present. No erythema present. No neck rigidity present.   Cardiovascular: Normal rate, regular rhythm, normal heart sounds and normal pulses.   Pulmonary/Chest: Effort normal and breath sounds normal. No stridor. No respiratory distress. He has no decreased breath sounds. He has no wheezes. He has no rhonchi.   Abdominal: Normal appearance.   Musculoskeletal: Normal range of motion.         General: No deformity. Normal range of motion.   Neurological: He is alert and oriented to person, place, and time. He exhibits normal muscle tone. Coordination normal.    Skin: Skin is warm, dry, intact, not diaphoretic and not pale.   Psychiatric: His speech is normal and behavior is normal. Judgment and thought content normal.   Nursing note and vitals reviewed.      Assessment:     1. Bacterial sinusitis    2. Chronic cough        Plan:   Treating for bacterial sinusitis given duration of symptoms. Refilled inhaler per pt request. Provided note for work.     Bacterial sinusitis  -     amoxicillin-clavulanate 875-125mg (AUGMENTIN) 875-125 mg per tablet; Take 1 tablet by mouth every 12 (twelve) hours. for 7 days  Dispense: 14 tablet; Refill: 0  -     fluticasone propionate (FLONASE) 50 mcg/actuation nasal spray; 1 spray (50 mcg total) by Each Nostril route once daily. for 7 days  Dispense: 16 g; Refill: 0    Chronic cough  -     fluticasone furoate (ARNUITY ELLIPTA) 100 mcg/actuation inhaler; Inhale 1 puff into the lungs daily as needed (cough). Controller  Dispense: 30 each; Refill: 0                Patient Instructions   Prescriptions sent to pharmacy:  Augmentin- antibiotic- take twice a day for 7 days. Take with food. May take a probiotic to help with GI side effects.    Flonase- steroid nasal spray- spray in nostrils 1-2 times a day to help with runny nose and sinus congestion    Refilled Flovent inhaler      Other recommendations:  Oral antihistamine (Claritin, Zyrtec, Allegra,or Xyzal) for post nasal drip and runny nose  Cough expectorant (Mucinex DM) to break up mucus in your chest. Take with a full glass of water    Please drink plenty of fluids.  Please get plenty of rest.  Nasal irrigation with a saline spray or Netti Pot like device per their directions is also recommended.  If you  smoke, please stop smoking.    If not allergic, take Tylenol (Acetaminophen) 650 mg to  1 g every 6 hours as needed  and/or Motrin (Ibuprofen) 600 to 800 mg every 6 hours as needed for fever or pain.    Please arrange follow up with your primary care physician or speciality clinic within  2-5 days if your signs and symptoms have not resolved or worsen.     Please return here or go to the Emergency Department for any concerns or worsening of condition.

## 2025-07-19 DIAGNOSIS — F41.9 ANXIETY: ICD-10-CM

## 2025-07-22 RX ORDER — SERTRALINE HYDROCHLORIDE 100 MG/1
100 TABLET, FILM COATED ORAL DAILY
Qty: 90 TABLET | Refills: 1 | Status: SHIPPED | OUTPATIENT
Start: 2025-07-22

## 2025-08-11 ENCOUNTER — OFFICE VISIT (OUTPATIENT)
Dept: INTERNAL MEDICINE | Facility: CLINIC | Age: 20
End: 2025-08-11
Payer: COMMERCIAL

## 2025-08-11 VITALS
BODY MASS INDEX: 24.82 KG/M2 | TEMPERATURE: 99 F | SYSTOLIC BLOOD PRESSURE: 110 MMHG | DIASTOLIC BLOOD PRESSURE: 59 MMHG | OXYGEN SATURATION: 97 % | RESPIRATION RATE: 18 BRPM | HEIGHT: 69 IN | HEART RATE: 53 BPM | WEIGHT: 167.56 LBS

## 2025-08-11 DIAGNOSIS — J30.1 SEASONAL ALLERGIC RHINITIS DUE TO POLLEN: ICD-10-CM

## 2025-08-11 DIAGNOSIS — J45.909 ASTHMA, UNSPECIFIED ASTHMA SEVERITY, UNSPECIFIED WHETHER COMPLICATED, UNSPECIFIED WHETHER PERSISTENT: Primary | ICD-10-CM

## 2025-08-11 DIAGNOSIS — F32.A DEPRESSION, UNSPECIFIED DEPRESSION TYPE: ICD-10-CM

## 2025-08-11 PROCEDURE — 3078F DIAST BP <80 MM HG: CPT | Mod: CPTII,S$GLB,, | Performed by: INTERNAL MEDICINE

## 2025-08-11 PROCEDURE — 3044F HG A1C LEVEL LT 7.0%: CPT | Mod: CPTII,S$GLB,, | Performed by: INTERNAL MEDICINE

## 2025-08-11 PROCEDURE — G2211 COMPLEX E/M VISIT ADD ON: HCPCS | Mod: S$GLB,,, | Performed by: INTERNAL MEDICINE

## 2025-08-11 PROCEDURE — 99214 OFFICE O/P EST MOD 30 MIN: CPT | Mod: S$GLB,,, | Performed by: INTERNAL MEDICINE

## 2025-08-11 PROCEDURE — 99999 PR PBB SHADOW E&M-EST. PATIENT-LVL III: CPT | Mod: PBBFAC,,, | Performed by: INTERNAL MEDICINE

## 2025-08-11 PROCEDURE — 3074F SYST BP LT 130 MM HG: CPT | Mod: CPTII,S$GLB,, | Performed by: INTERNAL MEDICINE

## 2025-08-11 PROCEDURE — 3008F BODY MASS INDEX DOCD: CPT | Mod: CPTII,S$GLB,, | Performed by: INTERNAL MEDICINE

## 2025-08-11 PROCEDURE — 1159F MED LIST DOCD IN RCRD: CPT | Mod: CPTII,S$GLB,, | Performed by: INTERNAL MEDICINE

## 2025-08-11 RX ORDER — ALBUTEROL SULFATE 90 UG/1
2 INHALANT RESPIRATORY (INHALATION) EVERY 6 HOURS PRN
Qty: 18 G | Refills: 1 | Status: SHIPPED | OUTPATIENT
Start: 2025-08-11 | End: 2026-08-11